# Patient Record
(demographics unavailable — no encounter records)

---

## 2024-10-14 NOTE — PHYSICAL EXAM
[Normal Appearance] : normal appearance [Well Groomed] : well groomed [General Appearance - In No Acute Distress] : no acute distress [Edema] : no peripheral edema [Respiration, Rhythm And Depth] : normal respiratory rhythm and effort [Exaggerated Use Of Accessory Muscles For Inspiration] : no accessory muscle use [Abdomen Soft] : soft [Abdomen Tenderness] : non-tender [Costovertebral Angle Tenderness] : no ~M costovertebral angle tenderness [Urinary Bladder Findings] : the bladder was normal on palpation [Normal Station and Gait] : the gait and station were normal for the patient's age [] : no rash [No Focal Deficits] : no focal deficits [Oriented To Time, Place, And Person] : oriented to person, place, and time [Affect] : the affect was normal [Mood] : the mood was normal [No Palpable Adenopathy] : no palpable adenopathy [FreeTextEntry3] : N/A  exam not performed

## 2024-10-14 NOTE — HISTORY OF PRESENT ILLNESS
[FreeTextEntry1] : KARYNA COREAS presents to the office today. He is a 47-year-old man here to discuss a possible prostate biopsy. He had yearly CT scans with [employer]. He was told he had an enlarged prostate. In November he developed a swollen testicle and was told he had a UTI at that time. He was prescribed Cipro. He follows up with his primary urologist, Dr Neri, his PSA have always been stable between 0.8ng/mL-1.0ng/mL. A prostate MRI was performed which showed a prostate volume of 34cc and a PI-RADS 3 lesion. He was told to undergo a prostate biopsy under anesthesia.   He has been experiencing urinary urgency, urinary frequency, nocturia x2 and a weaker stream. He also reports suprapubic discomfort with dysuria. At times the urine may also be cloudy.  He reports that he has chronic anxiety especially regarding his health. During stressful situations where he thinks something could be wrong, he starts to experience these urinary symptoms even worse.   He denies any family history of prostate cancer. He denies any hematuria.

## 2024-10-14 NOTE — ASSESSMENT
[FreeTextEntry1] : Reviewed prostate MRI images and reports with patient showing a PI-RADS 3 lesion.  PSA density would be 0.02ng/mL/mL with his most recent PSA of 0.8ng/mL.  He has symptoms of chronic prostatitis, discussed how the inflammation can show as a lesion on the prostate MRI UA/UC today PVR 22mL Doxycycline for 2 weeks for prostatitis. Goals of medication reviewed. Discussed potential side effects of medications. Discussed proper administration of medication.  Repeat PSA total and free after completion of Doxy Depending on results will recommend a biopsy or repeat MRI.  Recommended warm baths before bed.  Patient and wife in agreement with the plan.

## 2024-11-27 NOTE — ASSESSMENT
[FreeTextEntry1] : I personally reviewed the MRI images again done at [facility], not enough images were obtained and it is difficult to completely visualize  Recommended to repeat MRI at [facility] for plan for fusion biopsy  MRI is needed to reevaluate the previous PI-RADS 3 lesion  Discussed transperineal fusion guided biopsy with the patient today. Explained to patient that the MRI images and transrectal ultrasound images allows us to retrieve biopsy samples from the lesion seen on the MRI. We will also take samples from a 12 core biopsy template of the prostate to assess for the presence of clinically significant cancer. Discussed the use of local anesthesia for this procedure, in addition to the option for a mild oral sedative. Reviewed the importance of a fleet enema the morning of the procedure. Discussed with patient that a transperineal approach has a low risk for infection. Discussed risks and benefits of a transperineal fusion biopsy.

## 2024-11-27 NOTE — HISTORY OF PRESENT ILLNESS
Please enter pre visit labs.  Brenda Villanueva BSN, RN     [FreeTextEntry1] : KARYNA COREAS is a 47 year-old man with a history of a prostate lesion/abscess. Prostate MRI showed a prostate volume of 34cc and a PI-RADS 3 lesion. It was recommended by an outside urologist to undergo a biopsy. I had repeated his PSA which resulted as 1.1ng/mL. I recommended to hold off on a prostate biopsy.   He presents today after having a severe viral cold infection. He reports during that time he was taking Ibuprofen and found that his urination is much better. Now he notes that he has had one incident of urinary incontinence and his urination is not as strong.   He also reports that his semen has changed to a yellow gel-like substance. He reports the first time it was quite a few days between ejaculation, but the second time was closer together.

## 2024-11-27 NOTE — REASON FOR VISIT
[Follow-up Visit ___] : a follow-up visit  for [unfilled] [Other Location: e.g. School (Enter Location, City,State)___] : at [unfilled], at the time of the visit. [Medical Office: ([facility])___] : at the medical office located in  [Patient] : the patient

## 2025-02-11 NOTE — HISTORY OF PRESENT ILLNESS
[FreeTextEntry1] : KARYNA COREAS returns to the office today. He is a 47 year-old man who I met in October for after he had a prostate MRI showing a PI-RADS 3 lesion. He had a repeat MRI in December which showed a prostate volume of 41mL and no MRI targetable lesions. Foci of low T2 signal in the peripheral zone bilaterally with mild associated diffusion abnormality and enhancement with the largest region on the right, likely inflammatory.  PSA was checked last week and was 1.03ng/mL with 27% free fraction.   He has been drinking decaf green tea which he has noticed has helped his urination significantly

## 2025-02-11 NOTE — ASSESSMENT
[FreeTextEntry1] : Reviewed decreased PSA result of 1.03ng/mL with 27% free fraction Reviewed prostate MRI showing a prostate volume of 41mL and no MRI targetable lesions. Foci of low T2 signal in the peripheral zone bilaterally with mild associated diffusion abnormality and enhancement with the largest region on the right, likely inflammatory.  Continue Green tea if it helping with his urinary symptoms   Follow up in August, PSA ordered
